# Patient Record
Sex: FEMALE | Race: WHITE | NOT HISPANIC OR LATINO | URBAN - METROPOLITAN AREA
[De-identification: names, ages, dates, MRNs, and addresses within clinical notes are randomized per-mention and may not be internally consistent; named-entity substitution may affect disease eponyms.]

---

## 2022-02-02 ENCOUNTER — APPOINTMENT (OUTPATIENT)
Age: 42
Setting detail: DERMATOLOGY
End: 2022-02-02

## 2022-02-02 ENCOUNTER — APPOINTMENT (RX ONLY)
Age: 42
Setting detail: DERMATOLOGY
End: 2022-02-02

## 2022-02-02 VITALS
SYSTOLIC BLOOD PRESSURE: 118 MMHG | SYSTOLIC BLOOD PRESSURE: 118 MMHG | DIASTOLIC BLOOD PRESSURE: 78 MMHG | DIASTOLIC BLOOD PRESSURE: 78 MMHG

## 2022-02-02 DIAGNOSIS — L0390 CELLULITIS AND ABSCESS OF UNSPECIFIED SITES: ICD-10-CM

## 2022-02-02 DIAGNOSIS — L0391 CELLULITIS AND ABSCESS OF UNSPECIFIED SITES: ICD-10-CM

## 2022-02-02 PROBLEM — L02.416 CUTANEOUS ABSCESS OF LEFT LOWER LIMB: Status: ACTIVE | Noted: 2022-02-02

## 2022-02-02 PROCEDURE — ? TREATMENT REGIMEN

## 2022-02-02 PROCEDURE — ? ORDER TESTS

## 2022-02-02 PROCEDURE — ? COUNSELING

## 2022-02-02 PROCEDURE — 99203 OFFICE O/P NEW LOW 30 MIN: CPT

## 2022-02-02 ASSESSMENT — LOCATION SIMPLE DESCRIPTION DERM
LOCATION SIMPLE: LEFT CALF
LOCATION SIMPLE: LEFT CALF
LOCATION SIMPLE: LEFT ANKLE
LOCATION SIMPLE: LEFT ANKLE

## 2022-02-02 ASSESSMENT — LOCATION DETAILED DESCRIPTION DERM
LOCATION DETAILED: LEFT POSTERIOR ANKLE
LOCATION DETAILED: LEFT DISTAL CALF
LOCATION DETAILED: LEFT POSTERIOR ANKLE
LOCATION DETAILED: LEFT DISTAL CALF

## 2022-02-02 ASSESSMENT — LOCATION ZONE DERM
LOCATION ZONE: LEG
LOCATION ZONE: LEG

## 2022-02-02 NOTE — PROCEDURE: ORDER TESTS
Billing Type: Third-Party Bill
Expected Date Of Service: 02/02/2022
Bill For Surgical Tray: no
Performing Laboratory: [id number]

## 2022-02-02 NOTE — HPI: RASH
What Type Of Note Output Would You Prefer (Optional)?: Standard Output
Is The Patient Presenting As Previously Scheduled?: Yes
How Severe Is Your Rash?: severe
Is This A New Presentation, Or A Follow-Up?: Rash
Additional History: She isn’t sure if she got got bit by a spider. She stopped the clindamycin 2 days ago. She is using mupirocin ointment BID. She has a photo when the rash fist started. She seen by Urgent care about 3 weeks ago 1/15/22. She using mupirocin ointment

## 2022-02-02 NOTE — PROCEDURE: TREATMENT REGIMEN
Samples Given: Halog ointment first then apply First aid burn cream BID and cover wound, no change with mupiricin (will stop for now since she is on Bactrim)
Plan: Patient to call Friday if there’s no improvement. Will then order an x-ray if not better. Otherwise, f/u 7 days and await culture results. A photo was taken of her ankle today.
Continue Regimen: Bactrim DS BID
Initiate Treatment: Wound was cleaned with normal saline
Discontinue Regimen: Mupirocin ointment
Detail Level: Detailed

## 2022-02-02 NOTE — PROCEDURE: TREATMENT REGIMEN
Initiate Treatment: Wound was cleaned with normal saline
Continue Regimen: Bactrim DS BID
Detail Level: Detailed
Samples Given: Halog ointment first then apply First aid burn cream BID and cover wound, no change with mupiricin (will stop for now since she is on Bactrim)
Discontinue Regimen: Mupirocin ointment
Plan: Patient to call Friday if there’s no improvement. Will then order an x-ray if not better. Otherwise, f/u 7 days and await culture results. A photo was taken of her ankle today.

## 2022-02-07 ENCOUNTER — RX ONLY (RX ONLY)
Age: 42
End: 2022-02-07

## 2022-02-07 ENCOUNTER — APPOINTMENT (RX ONLY)
Age: 42
Setting detail: DERMATOLOGY
End: 2022-02-07

## 2022-02-07 ENCOUNTER — APPOINTMENT (OUTPATIENT)
Age: 42
Setting detail: DERMATOLOGY
End: 2022-02-07

## 2022-02-07 ENCOUNTER — RX ONLY (OUTPATIENT)
Age: 42
Setting detail: RX ONLY
End: 2022-02-07

## 2022-02-07 RX ORDER — CLOBETASOL PROPIONATE 0.5 MG/G
OINTMENT TOPICAL
Qty: 15 | Refills: 0 | Status: ERX | COMMUNITY
Start: 2022-02-07

## 2022-02-16 ENCOUNTER — APPOINTMENT (OUTPATIENT)
Age: 42
Setting detail: DERMATOLOGY
End: 2022-02-16

## 2022-02-16 ENCOUNTER — APPOINTMENT (RX ONLY)
Age: 42
Setting detail: DERMATOLOGY
End: 2022-02-16

## 2022-02-16 DIAGNOSIS — L97 NON-PRESSURE CHRONIC ULCER OF LOWER LIMB, NOT ELSEWHERE CLASSIFIED: ICD-10-CM

## 2022-02-16 PROBLEM — L97.229 NON-PRESSURE CHRONIC ULCER OF LEFT CALF WITH UNSPECIFIED SEVERITY: Status: ACTIVE | Noted: 2022-02-16

## 2022-02-16 PROCEDURE — 99213 OFFICE O/P EST LOW 20 MIN: CPT

## 2022-02-16 PROCEDURE — ? TREATMENT REGIMEN

## 2022-02-16 PROCEDURE — ? COUNSELING

## 2022-02-16 PROCEDURE — ? PRESCRIPTION

## 2022-02-16 RX ORDER — CIPROFLOXACIN HYDROCHLORIDE 500 MG/1
1 TABLET, FILM COATED ORAL BID
Qty: 20 | Refills: 0 | Status: ERX | COMMUNITY
Start: 2022-02-16

## 2022-02-16 RX ADMIN — CIPROFLOXACIN HYDROCHLORIDE 1: 500 TABLET, FILM COATED ORAL at 00:00

## 2022-02-16 ASSESSMENT — LOCATION DETAILED DESCRIPTION DERM
LOCATION DETAILED: LEFT DISTAL CALF
LOCATION DETAILED: LEFT DISTAL CALF

## 2022-02-16 ASSESSMENT — LOCATION ZONE DERM
LOCATION ZONE: LEG
LOCATION ZONE: LEG

## 2022-02-16 ASSESSMENT — LOCATION SIMPLE DESCRIPTION DERM
LOCATION SIMPLE: LEFT CALF
LOCATION SIMPLE: LEFT CALF

## 2022-02-16 NOTE — PROCEDURE: TREATMENT REGIMEN
Plan: A photo was taken of her ankle today. Told we will send her to [facility] wound care [place].
Initiate Treatment: Told to do a ACE wrap and leave area dry. Will refer her to wound care center.
Discontinue Regimen: Clobetasol ointment first then apply First aid burn cream BID and cover wound.
Detail Level: Detailed

## 2022-02-16 NOTE — PROCEDURE: TREATMENT REGIMEN
Initiate Treatment: Told to do a ACE wrap and leave area dry. Will refer her to wound care center.
Detail Level: Detailed
Discontinue Regimen: Clobetasol ointment first then apply First aid burn cream BID and cover wound.
Plan: A photo was taken of her ankle today. Told we will send her to [facility] wound care [place].

## 2024-02-26 ENCOUNTER — APPOINTMENT (OUTPATIENT)
Age: 44
Setting detail: DERMATOLOGY
End: 2024-02-26

## 2024-02-26 ENCOUNTER — APPOINTMENT (RX ONLY)
Age: 44
Setting detail: DERMATOLOGY
End: 2024-02-26

## 2024-02-26 DIAGNOSIS — L71.8 OTHER ROSACEA: ICD-10-CM | Status: INADEQUATELY CONTROLLED

## 2024-02-26 DIAGNOSIS — L21.8 OTHER SEBORRHEIC DERMATITIS: ICD-10-CM | Status: INADEQUATELY CONTROLLED

## 2024-02-26 PROCEDURE — ? PRESCRIPTION

## 2024-02-26 PROCEDURE — ? COUNSELING

## 2024-02-26 PROCEDURE — ? TREATMENT REGIMEN

## 2024-02-26 PROCEDURE — 99214 OFFICE O/P EST MOD 30 MIN: CPT

## 2024-02-26 RX ORDER — KETOCONAZOLE 20 MG/G
APPLY CREAM TOPICAL BID
Qty: 15 | Refills: 2 | Status: ERX | COMMUNITY
Start: 2024-02-26

## 2024-02-26 RX ORDER — SULFACETAMIDE SODIUM, SULFUR 98; 48 MG/G; MG/G
APPLY LIQUID TOPICAL QD
Qty: 285 | Refills: 4 | Status: ERX | COMMUNITY
Start: 2024-02-26

## 2024-02-26 RX ORDER — HYDROCORTISONE 25 MG/G
APPLY CREAM TOPICAL BID
Qty: 30 | Refills: 2 | Status: ERX | COMMUNITY
Start: 2024-02-26

## 2024-02-26 RX ORDER — OXYMETAZOLINE HYDROCHLORIDE 1 G/100G
CREAM TOPICAL
Qty: 30 | Refills: 3 | Status: ERX | COMMUNITY
Start: 2024-02-26

## 2024-02-26 RX ADMIN — KETOCONAZOLE APPLY: 20 CREAM TOPICAL at 00:00

## 2024-02-26 RX ADMIN — OXYMETAZOLINE HYDROCHLORIDE APPLY: 1 CREAM TOPICAL at 00:00

## 2024-02-26 RX ADMIN — SULFACETAMIDE SODIUM, SULFUR APPLY: 98; 48 LIQUID TOPICAL at 00:00

## 2024-02-26 RX ADMIN — HYDROCORTISONE APPLY: 25 CREAM TOPICAL at 00:00

## 2024-02-26 ASSESSMENT — LOCATION DETAILED DESCRIPTION DERM
LOCATION DETAILED: RIGHT CENTRAL MALAR CHEEK
LOCATION DETAILED: LEFT CENTRAL MALAR CHEEK
LOCATION DETAILED: RIGHT INFERIOR CENTRAL MALAR CHEEK
LOCATION DETAILED: RIGHT INFERIOR CENTRAL MALAR CHEEK
LOCATION DETAILED: LEFT INFERIOR CENTRAL MALAR CHEEK
LOCATION DETAILED: LEFT CENTRAL MALAR CHEEK
LOCATION DETAILED: RIGHT CENTRAL MALAR CHEEK
LOCATION DETAILED: LEFT INFERIOR CENTRAL MALAR CHEEK

## 2024-02-26 ASSESSMENT — LOCATION SIMPLE DESCRIPTION DERM
LOCATION SIMPLE: RIGHT CHEEK
LOCATION SIMPLE: LEFT CHEEK
LOCATION SIMPLE: LEFT CHEEK
LOCATION SIMPLE: RIGHT CHEEK

## 2024-02-26 ASSESSMENT — LOCATION ZONE DERM
LOCATION ZONE: FACE
LOCATION ZONE: FACE

## 2024-02-26 NOTE — PROCEDURE: TREATMENT REGIMEN
Initiate Treatment: sulfacetamide sodium 9.8 %-sulfur 4.8 % topical cleanser QD -  Wash face once daily (prescription sent to [facility]) \\nketoconazole 2 % topical cream Bid - Apply to the AA on the face BID for 3-4 weeks. Then 1-2 times a week for maintenance \\nhydrocortisone 2.5 % topical cream BID - Apply BID to affected area on face up to 2 weeks at a time. PRN for flares.
Plan: Recommended to initiate Rhofade after using hydrocortisone and ketoconazole cream
Initiate Treatment: Rhofade 1 % topical cream QDAM - Apply a thin layer to face every morning.\\nWash face with sodium sulfacetamine cleanser once daily
Detail Level: Zone
Plan: Recommended to mix two topicals together and apply to face for 2 weeks. Reassess if needed.

## 2024-02-26 NOTE — PROCEDURE: TREATMENT REGIMEN
Plan: Recommended to mix two topicals together and apply to face for 2 weeks. Reassess if needed.
Initiate Treatment: sulfacetamide sodium 9.8 %-sulfur 4.8 % topical cleanser QD -  Wash face once daily (prescription sent to [facility]) \\nketoconazole 2 % topical cream Bid - Apply to the AA on the face BID for 3-4 weeks. Then 1-2 times a week for maintenance \\nhydrocortisone 2.5 % topical cream BID - Apply BID to affected area on face up to 2 weeks at a time. PRN for flares.
Detail Level: Zone
Plan: Recommended to initiate Rhofade after using hydrocortisone and ketoconazole cream
Initiate Treatment: Rhofade 1 % topical cream QDAM - Apply a thin layer to face every morning.\\nWash face with sodium sulfacetamine cleanser once daily